# Patient Record
Sex: FEMALE | Race: AMERICAN INDIAN OR ALASKA NATIVE
[De-identification: names, ages, dates, MRNs, and addresses within clinical notes are randomized per-mention and may not be internally consistent; named-entity substitution may affect disease eponyms.]

---

## 2018-02-02 ENCOUNTER — HOSPITAL ENCOUNTER (OUTPATIENT)
Dept: HOSPITAL 5 - LABHHL | Age: 63
Discharge: HOME | End: 2018-02-02
Attending: SURGERY
Payer: COMMERCIAL

## 2018-02-02 DIAGNOSIS — C50.912: Primary | ICD-10-CM

## 2018-02-02 PROCEDURE — 88342 IMHCHEM/IMCYTCHM 1ST ANTB: CPT

## 2018-02-02 PROCEDURE — 88305 TISSUE EXAM BY PATHOLOGIST: CPT

## 2018-02-02 PROCEDURE — 88361 TUMOR IMMUNOHISTOCHEM/COMPUT: CPT

## 2018-02-20 ENCOUNTER — HOSPITAL ENCOUNTER (OUTPATIENT)
Dept: HOSPITAL 5 - SPVIMAG | Age: 63
Discharge: HOME | End: 2018-02-20
Attending: SURGERY
Payer: COMMERCIAL

## 2018-02-20 DIAGNOSIS — C50.412: Primary | ICD-10-CM

## 2018-02-20 PROCEDURE — 77059: CPT

## 2018-02-20 PROCEDURE — A9577 INJ MULTIHANCE: HCPCS

## 2018-02-20 PROCEDURE — C8908 MRI W/O FOL W/CONT, BREAST,: HCPCS

## 2018-02-21 NOTE — MAGNETIC RESONANCE REPORT
BILATERAL BREAST MRI WITHOUT AND WITH CONTRAST: 02/20/18 15:00:00



CLINICAL: Newly diagnosed left breast cancer.  Status post 

ultrasound-guided needle biopsy of the left breast at 2 sites 02/01/18. 

 Biopsy at 12 o'clock 4 cm from the nipple revealed scattered foci of 

invasive carcinoma of the special type in a background of extensive 

ductal carcinoma in situ, intermediate nuclear grade, [place] 

histologic grade moderately differentiated.  Biopsy at 12 o'clock 2 cm 

from the nipple revealed similar pathology.



COMPARISON:01/29/18 bilateral mammogram..



TECHNIQUE: Axial 1.0-mm T1 without,  axial high resolution 2.0-mm T2 

and axial 1.0-mm dynamic Vibrant high-resolution postcontrast T1 fat 

saturation sequences on a 1.5 Mehnaz magnet.  The examination was 

performed with an 8 channel dedicated [employer] breast coil. Post 

processing with CAD and subtraction was performed on an [employer] 

workstation.  15.0 cc of Multihance was injected  for the contrast 

portion of the exam. Consent was obtained prior to the administration 

of the contrast.  The patient vomited with the contrast injection.  



FINDINGS: The quality of the examination is degraded by patient motion 

and resultant misregistration on most of the sequences.



Right: Marked background parenchymal enhancement.  No mass or 

suspicious enhancement.  No suspicious lymph nodes.



Left: The left breast is larger than the right.  Marked background 

parenchymal enhancement.  An irregular enhancing mass in the upper 

inner quadrant correlates with the known cancer and measures 6.7 x 4.9 

x 3.4 cm.  It demonstrates heterogeneous enhancement with extension at 

its, 541% peak enhancement and 35% type III washout.  Both biopsy clips 

are identified within this mass.  A second mass is located subareolar 

and measures 3.7 x 3.2 x 1.9 cm in the lower inner quadrant.  It 

demonstrates heterogeneous enhancement with mixed kinetics, 163% peak 

enhancement and 21% type III washout.  2 suspicious level I axillary 

lymph nodes.  The largest measures 1.6 cm with a cortical thickness and 

5 mm.



IMPRESSION: 1.  Multicentric left breast cancer and two suspicious 

level I axillary lymph nodes.  2.  Negative right breast.



RIGHT BI-RADS  1 -- Negative





LEFT BI-RADS 6 -- Known Cancer

## 2018-03-06 ENCOUNTER — HOSPITAL ENCOUNTER (OUTPATIENT)
Dept: HOSPITAL 5 - SPVIMAG | Age: 63
Discharge: HOME | End: 2018-03-06
Attending: SURGERY
Payer: COMMERCIAL

## 2018-03-06 DIAGNOSIS — C50.412: Primary | ICD-10-CM

## 2018-03-06 PROCEDURE — A9577 INJ MULTIHANCE: HCPCS

## 2018-03-06 PROCEDURE — 77059: CPT

## 2018-03-06 PROCEDURE — C8908 MRI W/O FOL W/CONT, BREAST,: HCPCS

## 2018-03-14 ENCOUNTER — HOSPITAL ENCOUNTER (OUTPATIENT)
Dept: HOSPITAL 5 - SPVWC | Age: 63
Discharge: HOME | End: 2018-03-14
Attending: SURGERY
Payer: COMMERCIAL

## 2018-03-14 DIAGNOSIS — C50.411: Primary | ICD-10-CM

## 2018-03-14 PROCEDURE — 38505 NEEDLE BIOPSY LYMPH NODES: CPT

## 2018-03-14 PROCEDURE — 88305 TISSUE EXAM BY PATHOLOGIST: CPT

## 2018-03-14 PROCEDURE — 88342 IMHCHEM/IMCYTCHM 1ST ANTB: CPT

## 2018-03-14 NOTE — ULTRASOUND REPORT
ULTRASOUND GUIDED NEEDLE CORE BIOPSY OF A LEFT AXILLARY LYMPH NODE WITH 

CLIP PLACEMENT :  03/14/18 12:47:00 



   

CLINICAL: Known left breast cancer and 2 suspicious lymph nodes by 

recent MRI.



COMPARISON :03/06/18 MRI Breast 

   

FINDINGS: The procedure was explained to the patient and informed 

consent was obtained.

   

Ultrasound demonstrated two lymph nodes in the axillary tail this 

correlate with the MRI finding.  However, the larger lymph node 

demonstrates no cortical thickening with the cortex measuring less than 

4 mm.  A smaller lymph node has minimal if any central fat.  I chose 

this lymph node for biopsy.



The skin in the axilla was prepped with Betadine and anesthetized with 

1% lidocaine.  Ultrasound guided needle core biopsy of the  lymph node 

was performed through a small dermatotomy using 2% lidocaine with 

epinephrine for deep anesthesia and a 18-gauge Achieve biopsy device.  

2 samples were obtained and placed in formalin.  A clip was deployed 

within the lymph node.

   

Hemostasis was achieved with minimal pressure and a sterile dressing 

was applied.  The patient tolerated the procedure well and there were 

no apparent complications. She was discharged in good   

condition and was given instructions for wound care and followup.  

   

IMPRESSION: Uncomplicated ultrasound-guided needle core biopsy of a 

left lymph node with clip placement.

## 2018-04-10 NOTE — XRAY REPORT
FINAL REPORT



EXAM:  XR CHEST ROUTINE 2V



HISTORY:  PRE SURGICAL X-RAY. 



TECHNIQUE:  2 views of the chest.



PRIORS:  None.



FINDINGS:  

The cardiomediastinal silhouette appears normal. There is a small

focal area smooth pleural thickening in the left lateral lung

base. Otherwise, the lungs are clear. The bones and soft tissues

are unremarkable.



IMPRESSION:  

Small focal area of smooth pleural thickening in left lateral

lung base. This may represent pleural scar although a pleural

based mass cannot be excluded. Recommend comparison with prior

chest x-rays if there are any available. If not then follow-up

chest x-ray is recommended 6-8 weeks. 



No evidence of acute cardiopulmonary disease

## 2018-04-11 ENCOUNTER — HOSPITAL ENCOUNTER (INPATIENT)
Dept: HOSPITAL 5 - OR | Age: 63
LOS: 2 days | Discharge: HOME | DRG: 581 | End: 2018-04-13
Attending: SURGERY | Admitting: SURGERY
Payer: COMMERCIAL

## 2018-04-11 DIAGNOSIS — C50.412: Primary | ICD-10-CM

## 2018-04-11 DIAGNOSIS — C50.812: ICD-10-CM

## 2018-04-11 DIAGNOSIS — F17.210: ICD-10-CM

## 2018-04-11 DIAGNOSIS — G47.33: ICD-10-CM

## 2018-04-11 DIAGNOSIS — E66.9: ICD-10-CM

## 2018-04-11 DIAGNOSIS — H40.9: ICD-10-CM

## 2018-04-11 PROCEDURE — 71046 X-RAY EXAM CHEST 2 VIEWS: CPT

## 2018-04-11 PROCEDURE — 88333 PATH CONSLTJ SURG CYTO XM 1: CPT

## 2018-04-11 PROCEDURE — 93005 ELECTROCARDIOGRAM TRACING: CPT

## 2018-04-11 PROCEDURE — 88309 TISSUE EXAM BY PATHOLOGIST: CPT

## 2018-04-11 PROCEDURE — C1789 PROSTHESIS, BREAST, IMP: HCPCS

## 2018-04-11 PROCEDURE — 0HHV0NZ INSERTION OF TISSUE EXPANDER INTO BILATERAL BREAST, OPEN APPROACH: ICD-10-PCS | Performed by: PLASTIC SURGERY

## 2018-04-11 PROCEDURE — 0HTV0ZZ RESECTION OF BILATERAL BREAST, OPEN APPROACH: ICD-10-PCS | Performed by: PLASTIC SURGERY

## 2018-04-11 PROCEDURE — 78800 RP LOCLZJ TUM 1 AREA 1 D IMG: CPT

## 2018-04-11 PROCEDURE — G0378 HOSPITAL OBSERVATION PER HR: HCPCS

## 2018-04-11 PROCEDURE — A9541 TC99M SULFUR COLLOID: HCPCS

## 2018-04-11 PROCEDURE — 07B60ZX EXCISION OF LEFT AXILLARY LYMPHATIC, OPEN APPROACH, DIAGNOSTIC: ICD-10-PCS | Performed by: SURGERY

## 2018-04-11 PROCEDURE — 0HRV0JZ REPLACEMENT OF BILATERAL BREAST WITH SYNTHETIC SUBSTITUTE, OPEN APPROACH: ICD-10-PCS | Performed by: PLASTIC SURGERY

## 2018-04-11 PROCEDURE — 88331 PATH CONSLTJ SURG 1 BLK 1SPC: CPT

## 2018-04-11 PROCEDURE — 88307 TISSUE EXAM BY PATHOLOGIST: CPT

## 2018-04-11 PROCEDURE — 88342 IMHCHEM/IMCYTCHM 1ST ANTB: CPT

## 2018-04-11 PROCEDURE — 93010 ELECTROCARDIOGRAM REPORT: CPT

## 2018-04-11 PROCEDURE — 0HTV0ZZ RESECTION OF BILATERAL BREAST, OPEN APPROACH: ICD-10-PCS | Performed by: SURGERY

## 2018-04-11 PROCEDURE — 76098 X-RAY EXAM SURGICAL SPECIMEN: CPT

## 2018-04-11 RX ADMIN — HYDROMORPHONE HYDROCHLORIDE PRN MG: 1 INJECTION, SOLUTION INTRAMUSCULAR; INTRAVENOUS; SUBCUTANEOUS at 17:50

## 2018-04-11 RX ADMIN — HYDROMORPHONE HYDROCHLORIDE PRN MG: 1 INJECTION, SOLUTION INTRAMUSCULAR; INTRAVENOUS; SUBCUTANEOUS at 18:25

## 2018-04-11 RX ADMIN — GABAPENTIN SCH MG: 300 CAPSULE ORAL at 22:59

## 2018-04-11 RX ADMIN — HYDROMORPHONE HYDROCHLORIDE PRN MG: 1 INJECTION, SOLUTION INTRAMUSCULAR; INTRAVENOUS; SUBCUTANEOUS at 17:40

## 2018-04-11 RX ADMIN — MORPHINE SULFATE PRN MG: 2 INJECTION, SOLUTION INTRAMUSCULAR; INTRAVENOUS at 23:23

## 2018-04-11 RX ADMIN — CEFAZOLIN SCH MLS/MIN: 10 INJECTION, POWDER, FOR SOLUTION INTRAVENOUS at 21:48

## 2018-04-11 RX ADMIN — GABAPENTIN SCH: 300 CAPSULE ORAL at 23:21

## 2018-04-11 RX ADMIN — DOCUSATE SODIUM SCH MG: 100 CAPSULE, LIQUID FILLED ORAL at 21:39

## 2018-04-11 RX ADMIN — HYDROMORPHONE HYDROCHLORIDE PRN MG: 1 INJECTION, SOLUTION INTRAMUSCULAR; INTRAVENOUS; SUBCUTANEOUS at 18:15

## 2018-04-11 NOTE — OPERATIVE REPORT
Operative Report


Operative Report: 





Date of Service: April 11, 2018





Preoperative diagnosis: Multifocal left breast cancer of the upper outer 

quadrant





Postoperative diagnosis: Same





Procedure: Right total mastectomy and left total mastectomy with sentinel lymph 

node biopsy





Surgeon: Elizabeth Lopez M.D.





Asst.: Cyndy Bedoya MD





Anesthesia: Gen.





Findings: Left breast clips present within left total mastectomy.  4 sentinel 

lymph nodes identified and negative for malignancy on frozen section of 

pathology





Complications: None





Drains: per plastic surgery





Estimated blood loss: Minimal





Disposition: PACU in good condition





Indications for operative procedure: This is a 62-year-old  

lady with Stage I/II multifocal left breast cancer of the upper outer quadrant. 

Recommendations were to proceed with left mastectomy given multifocal breast 

cancer and  she wished to proceed with a prophylactic right total mastectomy.  

Patient wished to proceed with plastic reconstructive surgery.





Procedure in detail: The patient was taken to the operating room and was placed 

supine. Gen. anesthesia was administered. The left nipple was injected with 

radioisotope and 1cc methylene blue with 1 cc of saline. Ultrasound was used to 

jono area of known left breast cancer at the 12:00 position 4-5 cm from the 

nipple. Bilateral breast were prepped and draped in the normal sterile 

operative fashion. Timeout was performed. Typical mastectomy incision markings 

were made with left mastectomy marking to include known breast cancer at the 12:

00 position.








Attention was taken towards the right breast first. A skin incision was made 

with a 10 blade knife and dissection taken down to the subcutaneous tissues. 

First began raising of the superior flap to the level of the clavicle 

superiorly and posteriorly to the pectoralis muscle. Followed by raising of the 

medial flap to the level of the sternum and posteriorly to the pectoralis 

muscle. Followed by raising of the lateral flap to the level of the latissimus 

dorsi muscle and taken down posteriorly. Followed by raising of the inferior 

flap to the level of the inframammary fold taken posterior to the pectoralis 

muscle. The mastectomy/breast was removed from the pectoralis muscle without 

incident. The specimen was appropriately marked and sent to pathology. 

Hemostasis was obtained with the bovie cautery.  








Attention was taken towards the left breast.  A gamma probe was inserted into 

the axilla to identify the sentinel lymph node location. A skin incision was 

made with a 10 blade knife and dissection taken down to the subcutaneous 

tissues. First began raising of the superior flap to the level of the clavicle 

superiorly and posteriorly to the pectoralis muscle. Followed by raising of the 

medial flap to the level of the sternum and posteriorly to the pectoralis 

muscle. Followed by raising of the lateral flap to the level of the latissimus 

dorsi muscle and taken down posteriorly. The gamma probe was inserted into the 

axilla, the axillary fascia was opened and 4 sentinel lymph nodes were 

identified and dissected free, all remaining counts were less than 10% of  the 

highest SLN. Lymph nodes were sent to pathology with findings negative for 

malignancy noted on frozen section. Then proceeded with raising of the inferior 

flap to the level of the inframammary fold taken posterior to the pectoralis 

muscle. The mastectomy/breast was removed from the pectoralis muscle without 

incident. The specimen was appropriately marked and sent to radiology with 

findings of 2 breast clips present and sent to pathology. Hemostasis was 

obtained with the bovie cautery.  





Plastic surgery then proceeded with bilateral tissue expander placement.

## 2018-04-12 RX ADMIN — CYCLOBENZAPRINE PRN MG: 10 TABLET, FILM COATED ORAL at 10:10

## 2018-04-12 RX ADMIN — GABAPENTIN SCH MG: 300 CAPSULE ORAL at 05:25

## 2018-04-12 RX ADMIN — KETOROLAC TROMETHAMINE SCH MG: 30 INJECTION, SOLUTION INTRAMUSCULAR at 14:25

## 2018-04-12 RX ADMIN — GABAPENTIN SCH: 300 CAPSULE ORAL at 16:34

## 2018-04-12 RX ADMIN — DOCUSATE SODIUM SCH MG: 100 CAPSULE, LIQUID FILLED ORAL at 10:10

## 2018-04-12 RX ADMIN — MORPHINE SULFATE PRN MG: 2 INJECTION, SOLUTION INTRAMUSCULAR; INTRAVENOUS at 16:23

## 2018-04-12 RX ADMIN — CEFAZOLIN SCH MLS/MIN: 10 INJECTION, POWDER, FOR SOLUTION INTRAVENOUS at 05:25

## 2018-04-12 RX ADMIN — MORPHINE SULFATE PRN MG: 2 INJECTION, SOLUTION INTRAMUSCULAR; INTRAVENOUS at 02:56

## 2018-04-12 RX ADMIN — MORPHINE SULFATE PRN MG: 2 INJECTION, SOLUTION INTRAMUSCULAR; INTRAVENOUS at 10:18

## 2018-04-12 RX ADMIN — KETOROLAC TROMETHAMINE SCH MG: 30 INJECTION, SOLUTION INTRAMUSCULAR at 20:44

## 2018-04-12 NOTE — DISCHARGE SUMMARY
Providers





- Providers


Date of Admission: 


04/11/18 13:38





Date of discharge: 04/12/18


Attending physician: 


ABEL SEAY





Primary care physician: 


JENNIFER COPPOLA








Hospitalization


Reason for admission: pain control and monitor for bleeding


Procedures: 





bilateral mastectomy and reconstruction with expanders


Hospital course: 





admitted for pain control and drain teaching.  Sent home once pain controlled.  


Disposition: DC-01 TO HOME OR SELFCARE





Core Measure Documentation





- Palliative Care


Palliative Care/ Comfort Measures: Not Applicable





- Core Measures


Any of the following diagnoses?: none





Exam





- Physical Exam


Narrative exam: 





RRR


Unlabored


BREASTS with expanders in place, no hematoma.  Drains serousang.  PICOs in 

place.  





- Constitutional


Vitals: 


 











Temp Pulse Resp BP Pulse Ox


 


 98.9 F   95 H  24   149/75   96 


 


 04/12/18 07:21  04/12/18 07:21  04/12/18 07:55  04/12/18 07:21  04/12/18 07:21














Plan


Activity: advance as tolerated


Weight Bearing Status: Full Weight Bearing


Diet: regular


Wound: drain care as instructed, other (leave MARYA dressings in place)


Follow up with: 


ABEL SEAY MD [Staff Physician] - 7 Days


JENNIFER COPPOLA MD [Primary Care Provider] - 7 Days


LYNDSEY CARLIN MD [Staff Physician] - 7 Days

## 2018-04-12 NOTE — OPERATIVE REPORT
PREOPERATIVE DIAGNOSIS:  Left-sided breast cancer.



POSTOPERATIVE DIAGNOSIS:  Left-sided breast cancer.



PROCEDURE:

1.  Bilateral breast reconstruction immediate with tissue expander, CPT code

64472-31.

2.  Bilateral breast reconstruction with implantation of biological implant,

FlexHD, CPT code 42243-92.

3.  Application of negative pressure wound VAC device, MARYA, to bilateral

breasts for postoperative wound healing, CPT code 89449-30.



SURGEON:  Sb Chase MD



ASSISTANT:  None.



ANESTHESIA:  General.



OPERATIVE INDICATIONS:  This is a 62-year-old female who was referred to me for

newly diagnosed left breast cancer.  Plan was to undergo bilateral mastectomy

and options for reconstruction were given to the patient.  Given her smoking

history and her weight, we decided on a tissue expander reconstruction as it had

the lowest rate of complications and we could then proceed with autologous

reconstruction in the future if she was able to quit smoking and lose weight. 

Risks and benefits of surgery were discussed with the patient and she agreed.



OPERATIVE DETAILS:  After informed consent was obtained, the patient was brought

to the operating room and placed supine on the operating table.  Preoperative

antibiotics and general anesthesia were administered.  The patient was prepped

and draped in usual sterile fashion.  A timeout was called verifying the patient

and the operation being performed.  Dr. Lopez began the operation.  Her

portion will be dictated separately.  I came into the room after she had

completed the right-sided mastectomy.  I assessed the defect, measured the chest

wall, and then elevated the pectoralis major muscle creating a subpectoral

pocket and divided inferiorly.  I then took a Goliad Artoura high profile 600 mL

tissue expander, serial [id number] and placed in the subpectoral pocket and

secured to the chest wall using the suture tabs.  I then took a piece of FlexHD

pliable perforated large mesh and secured that to the inframammary border and

lateral breast border and the serial number on that was [id number].  The

air had been completely removed from the tissue expander and had been soaked in

triple antibiotic irrigation and at this point, we then completed the

reconstruction by sewing the pectoralis major muscle to the FlexHD.  I placed

two 15-Lao Alberto drains into the prepectoral space.  We then placed 200 mL of

saline into the tissue expander and irrigated the pocket achieved hemostasis and

closed, 2-0 Vicryl interrupted sutures were used to bring the flaps together and

then a running 2-0 Monocryl barbed suture was used to close the skin.  At this

point, Dr. Lopez was done with the left side.  I assessed again a defect

which was the mastectomy defect with sentinel node biopsy.  We performed the

same operation on that side.  Again, making a subpectoral pocket and placing 600

mL Goliad Artoura high profile implant.  The serial number on that one was

[id number] and the mesh used on that side was serial number [id number]. 

The implant was completely covered with a pectoralis major and FlexHD, they were

sewn together and then 200 mL of saline was placed in the expander as well.  A

19 and 15-Lao Alberto drain were placed on that side in the prepectoral space. 

Irrigation was performed, hemostasis was achieved and the wound was closed with

2-0 Vicryl sutures and a running 2-0 Monocryl barbed suture.  At the completion

of the case in order to assist with wound healing, a MARYA negative pressure

wound VAC device was applied to both incisions.  A seal was obtained.  Suction

was adequate and there was no leak.  The patient tolerated the procedure well,

was awakened from general anesthesia, transferred to PACU in stable condition.



ESTIMATED BLOOD LOSS:  100 mL.



COMPLICATIONS:  None.



SPECIMENS:  None per my portion of the procedure.



IMPLANTS:  See above.



FINDINGS:  Bilateral mastectomy defects.





DD: 04/11/2018 17:57

DT: 04/11/2018 19:26

JOB# [id number]  [id number]

BHA/ABRIL

## 2018-04-12 NOTE — XRAY REPORT
Operative specimen mammogram:



A single tissue specimen is submitted. A marker is present in the 

central portion of the specimen at the margin of an ill-defined area of 

parenchymal density. These are well within the specimen margins.

## 2018-04-13 VITALS — SYSTOLIC BLOOD PRESSURE: 134 MMHG | DIASTOLIC BLOOD PRESSURE: 74 MMHG

## 2018-04-13 RX ADMIN — CEFAZOLIN SCH MLS/MIN: 10 INJECTION, POWDER, FOR SOLUTION INTRAVENOUS at 03:34

## 2018-04-13 RX ADMIN — GABAPENTIN SCH: 300 CAPSULE ORAL at 00:05

## 2018-04-13 RX ADMIN — DOCUSATE SODIUM SCH MG: 100 CAPSULE, LIQUID FILLED ORAL at 10:45

## 2018-04-13 RX ADMIN — HYDROMORPHONE HYDROCHLORIDE PRN MG: 2 TABLET ORAL at 12:54

## 2018-04-13 RX ADMIN — HYDROMORPHONE HYDROCHLORIDE PRN MG: 2 TABLET ORAL at 08:42

## 2018-04-13 RX ADMIN — CYCLOBENZAPRINE PRN MG: 10 TABLET, FILM COATED ORAL at 10:45

## 2018-04-13 RX ADMIN — CYCLOBENZAPRINE PRN MG: 10 TABLET, FILM COATED ORAL at 02:35

## 2018-04-13 RX ADMIN — HYDROMORPHONE HYDROCHLORIDE PRN MG: 2 TABLET ORAL at 01:04

## 2018-04-13 RX ADMIN — KETOROLAC TROMETHAMINE SCH MG: 30 INJECTION, SOLUTION INTRAMUSCULAR at 02:46

## 2018-04-13 NOTE — PROGRESS NOTE
Assessment and Plan





POD 2 s/p bilateral tissue expander reconstruction





-unclear as to reason for severe pain


-will switch to dilaudid PO, increase gabapentin, start flexeril and motrin and 

see how she does


-possible discharge later today if pain can be better controlled.  





Subjective


Date of service: 04/13/18


Narrative: 





Patient was setup to discharge yesterday but started having more pain that was 

not responding to medication.  We have tried multiple different modalities.  

Per nursing report, patient is usually asleep and comfortable, but upon 

entering room she awakens and then complains of 10/10 pain.  Not sure what the 

driving force is.  Because of that, we had to stop her discharge and keep her 

overnight.  


She states she is feeling a little better but still having really bad spasms 

and pains.  





Objective


 Vital Signs - 12hr











  04/12/18 04/12/18 04/12/18





  20:05 20:44 21:14


 


Temperature 98.3 F  


 


Pulse Rate 100 H  


 


Respiratory 20 20 18





Rate   


 


Blood Pressure 142/92  





[Left]   














  04/13/18 04/13/18 04/13/18





  00:00 01:04 02:04


 


Temperature 98.2 F  


 


Pulse Rate 88  


 


Respiratory 20 18 20





Rate   


 


Blood Pressure 165/85  





[Left]   














  04/13/18 04/13/18 04/13/18





  02:46 03:16 04:15


 


Temperature   98.4 F


 


Pulse Rate   84


 


Respiratory 18 16 20





Rate   


 


Blood Pressure   126/83





[Left]   














- General physical appearance


Narrative Exam: 





RRR


Unlabored


BREASTS with expanders in place, no hematoma.  Drains serousang.  PICOs in 

place.  There is no sign of bleeding or complication I can see on exam to 

explain her intense pain

## 2018-05-01 ENCOUNTER — HOSPITAL ENCOUNTER (OUTPATIENT)
Dept: HOSPITAL 5 - SPVWC | Age: 63
Discharge: HOME | End: 2018-05-01
Attending: INTERNAL MEDICINE
Payer: COMMERCIAL

## 2018-05-01 DIAGNOSIS — C50.412: ICD-10-CM

## 2018-05-01 DIAGNOSIS — M81.0: Primary | ICD-10-CM

## 2018-05-01 PROCEDURE — 77080 DXA BONE DENSITY AXIAL: CPT

## 2018-05-02 NOTE — MAMMOGRAPHY REPORT
BONE DENSITY STUDY:



Osteoporosis screening.



DEFINITIONS:

  BMD     = Bone Mineral Density

  T-score = BMD related to mean peak bone mass of young adult

            (mean expressed in Standard Deviation)

  Z-score = Age matched BMD expressed in SD



World Health Organization (WHO) Diagnostic Criteria

  Normal             T-score > -1 SD

  Osteopenia      T-score between -1 and -2.4 SD

  Osteoporosis    T-score -2.5 SD or below



FINDINGS:

The weighted average BMD of lumbar spine L1-L4 is 0.769 with a

T-score of -3.5.



The weighted average BMD of the right hip is 0.834 with a T-score of 

-1.3.



IMPRESSION:

The patient's average T-score is diagnostic for osteoporosis and high

relative risk for fracture.



NOTE:

      BMD is not the only risk factor for fracture; also consider 

factors such as the patient's age, risk of falling, previous 

osteoporotic fracture, family history of osteoporotic fractures, 

current smoker, and low body weight.



      Sanches's triangle is a region of interest in femur, predominantly 

of trabecular bone.  It is not a true anatomic site, and ISCD does not 

recommend its use clinically.

## 2019-10-08 ENCOUNTER — HOSPITAL ENCOUNTER (OUTPATIENT)
Dept: HOSPITAL 5 - LABHHL | Age: 64
Discharge: HOME | End: 2019-10-08
Attending: SURGERY
Payer: COMMERCIAL

## 2019-10-08 DIAGNOSIS — Z90.710: ICD-10-CM

## 2019-10-08 DIAGNOSIS — N60.01: Primary | ICD-10-CM

## 2019-10-08 PROCEDURE — 88112 CYTOPATH CELL ENHANCE TECH: CPT

## 2021-01-27 NOTE — SHORT STAY SUMMARY
Short Stay Documentation


Date of service: 04/11/18





- History


H&P: obtained from office





- Allergies and Medications


Current Medications: 


 Allergies





No Known Allergies Allergy (Verified 04/09/18 16:48)


 





 Home Medications











 Medication  Instructions  Recorded  Confirmed  Last Taken  Type


 


Brimonidine/Timolol 0.2-0.5% 1 drop OP BID 04/09/18 04/11/18 04/11/18 07:00 

History





[Combigan 0.2-0.5%]     


 


Latanoprost 0.005% 1 drop OP QHS 04/09/18 04/11/18 04/11/18 07:00 History








Active Medications





Hydromorphone HCl (Dilaudid)  0.25 mg IV Q10MIN PRN


   PRN Reason: Pain, Moderate (4-6)


   Stop: 04/11/18 23:59


Hydromorphone HCl (Dilaudid)  0.5 mg IV Q10MIN PRN


   PRN Reason: Pain , Severe (7-10)


   Stop: 04/11/18 23:59


Lactated Ringer's (Lactated Ringers)  1,000 mls @ 125 mls/hr IV AS DIRECT MURPHY


   Last Admin: 04/11/18 10:25 Dose:  125 mls/hr


Lactated Ringer's (Lactated Ringers)  1,000 mls @ 100 mls/hr IV AS DIRECT MURPHY


Ketorolac Tromethamine (Toradol)  15 mg IV ONCE PRN


   PRN Reason: Pain, Mild (1-3)


Meperidine HCl (Demerol)  25 mg IV ONCE PRN


   PRN Reason: Shivering


   Stop: 04/11/18 23:59


Midazolam HCl (Versed)  2 mg IV PREOP NR


   Stop: 04/11/18 23:59


   Last Admin: 04/11/18 10:31 Dose:  2 mg


Naloxone HCl (Narcan 0.4 Mg/1 Ml)  0.1 mg IV Q2MIN PRN


   PRN Reason: Res Rate </= 8 or 02 SAT < 92%


   Stop: 04/11/18 23:59


Ondansetron HCl (Zofran)  4 mg IV ONCE PRN


   PRN Reason: Nausea And Vomiting











- Brief post op/procedure progress note


Date of procedure: 04/11/18


Pre-op diagnosis: Left breast cancer of the upper outer quadrant


Post-op diagnosis: same


Procedure: 





Left total mastectomy with SLNB and right total mastectomy


Anesthesia: DANIEL


Surgeon: ABEL SEAY


Assistant: MARIA DEL CARMEN VERDUGO


Estimated blood loss: minimal


Pathology: list (bilateral mastectomy and left SLNB)


Specimen disposition: to lab


Condition: stable





- Disposition


Condition at discharge: Good


Disposition: DC/TX-02 SHRT-TRM GEN HOSP IP





Short Stay Discharge Plan


Activity: other (no heavy lifting)


Diet: regular


Wound: other (may shower in 24 hours; do not rub or scrub incision; per 

plastics as well)


Follow up with: 


JENNIFER COPPOLA MD [Primary Care Provider] - 7 Days


ABEL SEAY MD [Staff Physician] - 7 Days 27-Jan-2021

## 2021-05-12 NOTE — MAMMOGRAPHY REPORT
DEXA BONE DENSITY SCAN



INDICATION / CLINICAL INFORMATION:

AGE RELATED OSTEOPOROSIS W/O CURRENT PATHOLOGICAL FRACTURE M81.0.

65 years Female



COMPARISON: 

5/1/2018



LUMBAR SPINE, L1-L4:

- Bone mineral density (BMD) = 0.838 g/cm2.

- T-score = -2.8 

- Z-score = -1.0 



Change (%) since most recent prior (if available):  -2.4



LEFT HIP, NECK :

- Bone mineral density (BMD) = 0.835 g/cm2.

- T-score = -0.8 

- Z-score = 0.4 



Change (%) since most recent prior (if available):  None available.







IMPRESSION:

1. WHO Classification: Osteoporosis. Fracture Risk: High.







=================================================================

BMD Reporting Guidelines (ISCD, 2015)

=================================================================

BMD Reporting in Postmenopausal Women and in Men Age 50 and Older

*  T-scores are preferred.

*  The WHO densitometric classification is applicable.

BMD Reporting in Females Prior to Menopause and in Males Younger Than Age 50

*  Z-scores, not T-scores, are preferred. This is particularly important in children.

*  A Z-score of -2.0 or lower is defined as below the expected range for age, and a Z-score above -2.
0 is within the expected range for age.

*  Osteoporosis cannot be diagnosed in men under age 50 on the basis of BMD alone.

*  The WHO diagnostic criteria may be applied to women in the menopausal transition.





http://www.iscd.org/official-positions/0103-vgdt-pmimimbe-positions-adult/





Signer Name: Tony Morton MD 

Signed: 5/12/2021 1:41 PM

Workstation Name: [id number]

## 2024-05-30 NOTE — ANESTHESIA CONSULTATION
Anesthesia Consult and Med Hx


Date of service: 04/11/18





- Airway


Anesthetic Teeth Evaluation: Poor


ROM Head & Neck: Adequate


Mental/Hyoid Distance: Adequate


Mallampati Class: Class III


Intubation Access Assessment: Probably Good





- Pre-Operative Health Status


ASA Pre-Surgery Classification: ASA2


Proposed Anesthetic Plan: General





- Pre-Anesthesia Comment


Pre-Anesthesia Comments: Tolerates 6 METS.  AD: does not use CPAP.  Glaucoma: 

beware or central anticholinesterases.  No Motion sickness or N/V





- Pulmonary


Hx Smoking: Yes (FOR ABOUT 4 YEARS, QUIT IN 3/2017)


Hx Asthma: No


Hx Respiratory Symptoms: No


SOB: No


COPD: No


Hx Sleep Apnea: Yes





- Cardiovascular System


Hx Hypertension: No


Hx Coronary Artery Disease: No


Hx Heart Attack/AMI: No


Hx Angina: No





- Central Nervous System


Hx Neuromuscular Disorder: No


Hx Seizures: No


CVA: No


Hx Psychiatric Problems: No





- Gastrointestinal


Hx Gastroesophageal Reflux Disease: No





- Endocrine


Hx Renal Disease: No





- Other Systems


Hx Alcohol Use: Yes (OCCAS)


Hx Substance Use: No


Hx Cancer: Yes


Hx Obesity: Yes
fall

## 2024-12-25 NOTE — POST OPERATIVE NOTE
Pre-op diagnosis: left breast cancer


Post-op diagnosis: same


Findings: 





left breast cancer


Procedure: 





bilateral breast reconstruction with tissue expanders and biologic mesh


Anesthesia: GETA


Surgeon: LYNDSEY CARLIN


Estimated blood loss: 50-100ml


Pathology: none


Condition: stable


Disposition: PACU No